# Patient Record
Sex: FEMALE | Race: WHITE | URBAN - METROPOLITAN AREA
[De-identification: names, ages, dates, MRNs, and addresses within clinical notes are randomized per-mention and may not be internally consistent; named-entity substitution may affect disease eponyms.]

---

## 2022-01-11 ENCOUNTER — OFFICE VISIT (OUTPATIENT)
Dept: URBAN - METROPOLITAN AREA CLINIC 11 | Facility: CLINIC | Age: 42
End: 2022-01-11
Payer: COMMERCIAL

## 2022-01-11 DIAGNOSIS — H11.152 PINGUECULA, LEFT EYE: ICD-10-CM

## 2022-01-11 DIAGNOSIS — H40.033 ANATOMICAL NARROW ANGLE, BILATERAL: Primary | ICD-10-CM

## 2022-01-11 PROCEDURE — 99203 OFFICE O/P NEW LOW 30 MIN: CPT | Performed by: OPTOMETRIST

## 2022-01-11 ASSESSMENT — VISUAL ACUITY
OS: 20/20
OD: 20/20

## 2022-01-11 NOTE — IMPRESSION/PLAN
Impression: Pinguecula, left eye: H11.152. Plan: Advised patient on UV protection and caution in windy/memo environments. Instructed patient to use ATs QID/PRN for lubrication. No surgical intervention recommended at this time.

## 2022-01-11 NOTE — IMPRESSION/PLAN
Impression: Anatomical narrow angle, bilateral: H40.033. Plan: Educated patient on condition and risk of occlusion. YAG PI not indicated at this time. Instructed patient to RTC if she notices sudden eye pain, decreased vision, or light sensitivity.